# Patient Record
(demographics unavailable — no encounter records)

---

## 2025-03-05 NOTE — HISTORY OF PRESENT ILLNESS
[FreeTextEntry1] : *** 03/05/2025 *** YUKI WELLINGTON is a 34 yo F for second opinion regarding frequent seizures. Her seizures started in 2020 and were mainly nocturnal. She had 10 seizures 8 nocturnal and 2 diurnal. semiology stereotype: grinding teeth's, stiffening and drooling. mostly tonic and less clonic. lasting 3-7 minutes with 4 hours postictal period. has a 6 years old child and she is sleep deprived  often. 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 no events cpatured.

## 2025-03-05 NOTE — ASSESSMENT
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 high SUDEP risk.  Plan: emergent EMU admission for event characterization and localization for presurgical eval. candidate for xcopri titration  nayzilam rescue PET scan and MRI as outpatient

## 2025-04-17 NOTE — ASSESSMENT
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 high SUDEP risk.  MRI and PET w amygdala enlargement, possible seizure focus. stable on xcopri will repeat MRI w contrast FDOPA MRI was denied

## 2025-04-17 NOTE — ASSESSMENT
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 high SUDEP risk.  MRI and PET w amygdala enlargement, possible seizure focus. stable on xcopri will repeat MRI w contrast FDOPA MRI was denied

## 2025-04-17 NOTE — HISTORY OF PRESENT ILLNESS
[FreeTextEntry1] : *** 03/05/2025 *** YUKI WELLINGTON is a 36 yo F for second opinion regarding frequent seizures. Her seizures started in 2020 and were mainly nocturnal. She had 10 seizures 8 nocturnal and 2 diurnal. semiology stereotype: grinding teeth's, stiffening and drooling. mostly tonic and less clonic. lasting 3-7 minutes with 4 hours postictal period. has a 6 years old child and she is sleep deprived  often. 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 no events cpatured.

## 2025-05-27 NOTE — HISTORY OF PRESENT ILLNESS
[FreeTextEntry1] : *** 05/27/2025 *** YUKI WELLINGTON is here for follow up. she is tired with 50 mg xcopri but she did not have any seizures and she is less depressed.( maybe she had subclinical seizures) her PET scan was reflecting increased seizures rather than glial neoplasm. 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 Her seizures started in 2020 and were mainly nocturnal. She had 10 seizures 8 nocturnal and 2 diurnal. semiology stereotype: grinding teeth's, stiffening and drooling. mostly tonic and less clonic. lasting 3-7 minutes with 4 hours postictal period. has a 6 years old child and she is sleep deprived  often. 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 no events cpatured.

## 2025-05-27 NOTE — ASSESSMENT
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 high SUDEP risk.  MRI and PET w amygdala enlargement, possible seizure focus. stable on xcopri. continue 50 daily will increase if she has another seizure FDOPA MRI was denied